# Patient Record
Sex: FEMALE | Race: BLACK OR AFRICAN AMERICAN | Employment: UNEMPLOYED | URBAN - METROPOLITAN AREA
[De-identification: names, ages, dates, MRNs, and addresses within clinical notes are randomized per-mention and may not be internally consistent; named-entity substitution may affect disease eponyms.]

---

## 2017-04-11 ENCOUNTER — HOSPITAL ENCOUNTER (OUTPATIENT)
Dept: GENERAL RADIOLOGY | Age: 57
Discharge: HOME | End: 2017-04-11
Payer: MEDICARE

## 2017-04-11 DIAGNOSIS — R76.11 PPD POSITIVE: ICD-10-CM

## 2017-04-11 PROCEDURE — 71020 XR CHEST PA LAT: CPT

## 2018-08-01 ENCOUNTER — HOSPITAL ENCOUNTER (EMERGENCY)
Age: 58
Discharge: HOME | End: 2018-08-01
Attending: EMERGENCY MEDICINE
Payer: MEDICARE

## 2018-08-01 VITALS
BODY MASS INDEX: 33.57 KG/M2 | DIASTOLIC BLOOD PRESSURE: 83 MMHG | TEMPERATURE: 98.6 F | OXYGEN SATURATION: 93 % | WEIGHT: 208.9 LBS | RESPIRATION RATE: 18 BRPM | HEART RATE: 102 BPM | HEIGHT: 66 IN | SYSTOLIC BLOOD PRESSURE: 150 MMHG

## 2018-08-01 DIAGNOSIS — M25.561 ARTHRALGIA OF RIGHT LOWER LEG: Primary | ICD-10-CM

## 2018-08-01 PROCEDURE — 99282 EMERGENCY DEPT VISIT SF MDM: CPT

## 2018-08-01 PROCEDURE — 93971 EXTREMITY STUDY: CPT

## 2018-08-01 RX ORDER — METHADONE HYDROCHLORIDE 10 MG/ML
5 CONCENTRATE ORAL
COMMUNITY

## 2018-08-01 RX ORDER — NAPROXEN 500 MG/1
500 TABLET ORAL 2 TIMES DAILY WITH MEALS
Qty: 20 TAB | Refills: 0 | Status: SHIPPED | OUTPATIENT
Start: 2018-08-01 | End: 2018-09-30

## 2018-08-01 NOTE — ED NOTES
Pt presents to the ED with c/o right leg pain x3 days. Pt reports he pain is sharp and nothing relieves the pian. Pt reports her left leg was hurting and they did a blood clot study and did not find anything for that they found that her left hip was broken. Pt reports she was recently bit by a mosquito on her right ankle but does not think tat is related. Pt denies taking any medications for pain. Pt is alert and oriented. Pt skin is warm and dry. No redness or hot area noted to patients right leg. Pt has palpable pulses in affected extremity. Pt is ambulatory independently. Emergency Department Nursing Plan of Care The Nursing Plan of Care is developed from the Nursing assessment and Emergency Department Attending provider initial evaluation. The plan of care may be reviewed in the ED Provider note. The Plan of Care was developed with the following considerations:  
Patient / Family readiness to learn indicated by:verbalized understanding Persons(s) to be included in education: patient Barriers to Learning/Limitations:No 
 
Signed Gearlean Slipper 8/1/2018   8:50 AM

## 2018-08-01 NOTE — PROCEDURES
[facility]  *** FINAL REPORT ***    Name: Sonu Longoria  MRN: [id number]  : 1960  HIS Order #: [id number]  [id number] [facility] Visit #: [id number]  Date: 01 Aug 2018    TYPE OF TEST: Peripheral Venous Testing    REASON FOR TEST  Pain in limb, Limb swelling    Right Leg:-  Deep venous thrombosis:           No  Superficial venous thrombosis:    No  Deep venous insufficiency:        Not examined  Superficial venous insufficiency: Not examined      INTERPRETATION/FINDINGS  Right leg :  1. Deep vein(s) visualized include the external iliac, common femoral,   proximal femoral, mid femoral, popliteal(fossa), posterior tibial and   peroneal veins. 2. No evidence of deep venous thrombosis detected in the veins  visualized. 3. No evidence of deep vein thrombosis in the contralateral common  femoral vein. 4. No evidence of superficial thrombosis detected. ADDITIONAL COMMENTS    I have personally reviewed the data relevant to the interpretation of  this  study. TECHNOLOGIST: Mickey Nath RVT  Signed: 2018 02:46 PM    PHYSICIAN: Valentine George.  Marcia Deng MD  Signed: 2018 01:38 PM

## 2018-08-01 NOTE — ED PROVIDER NOTES
EMERGENCY DEPARTMENT HISTORY AND PHYSICAL EXAM 
 
 
Date: 8/1/2018 Patient Name: You Weber History of Presenting Illness Chief Complaint Patient presents with  Leg Pain  
  pt reported rt leg pain x 3 days without known injury/fall History Provided By: Patient HPI: You Weber, 60 y.o. female with PMHx significant for asthma, htn, COPD, heart murmur, depression, sleep apnea presents ambulatory to the ED with cc of right leg pain x 3 days. Reports pain as sharp and radiating down leg. Denies low back pain. Denies trauma/injury. Denies numbness/tingling, swelling, erythema, warmth, drainage. Rates pain 4/10. Has not taken anything for sx. Pain worse with movement. Denies hx blood clot, estrogen use, recent chemotherapy or recent surgery. There are no other complaints, changes, or physical findings at this time. PCP: Nunu Trujillo NP Current Outpatient Prescriptions Medication Sig Dispense Refill  methadone (DOLOPHINE) 10 mg/mL solution Take 10 mg by mouth.  naproxen (NAPROSYN) 500 mg tablet Take 1 Tab by mouth two (2) times daily (with meals). 20 Tab 0  
 losartan-hydrochlorothiazide (HYZAAR) 100-25 mg per tablet Take 1 Tab by mouth daily.  PROAIR HFA 90 mcg/actuation inhaler   11  
 cloNIDine (CATAPRES) 0.1 mg tablet Take 0.3 mg by mouth daily.  ASPIRIN PO Take 81 ng by mouth daily.  albuterol (PROVENTIL VENTOLIN) 2.5 mg /3 mL (0.083 %) nebulizer solution 2.5 mg by Nebulization route three (3) times daily as needed for Wheezing. Past History Past Medical History: 
Past Medical History:  
Diagnosis Date  Arthritis  Asthma  Chronic obstructive pulmonary disease ([facility] [address]  Chronic pain   
 back - was seeing a pain specialist  
 Hypertension  Other ill-defined conditions(159.63)   
 heart murmur  Psychiatric disorder   
 on depression med after death of   Unspecified sleep apnea   
 uses CPAP/oxygen for night use on order but has not gotten machine Past Surgical History: 
Past Surgical History:  
Procedure Laterality Date  HX HYSTERECTOMY  HX ORTHOPAEDIC Bilateral   
 hip replacements  HX ORTHOPAEDIC Right   
 knee replacement  HX ORTHOPAEDIC Left   
 callus removed from toe  HX ORTHOPAEDIC Left   
 cyst removed from back of hand  NEUROLOGICAL PROCEDURE UNLISTED    
 4th and 5th discs in neck removed Family History: 
Family History Problem Relation Age of Onset  Diabetes Mother  Hypertension Mother  Hypertension Father  Diabetes Father  Liver Disease Father  Lung Disease Father   
  asbestos Social History: 
Social History Substance Use Topics  Smoking status: Current Every Day Smoker Packs/day: 0.50  Smokeless tobacco: Never Used Comment: cigarettes  Alcohol use No  
 
 
Allergies: Allergies Allergen Reactions  Robaxin [Methocarbamol] Rash, Itching and Nausea Only Review of Systems Review of Systems Constitutional: Negative for chills and fever. Respiratory: Negative for shortness of breath. Cardiovascular: Negative for chest pain. Gastrointestinal: Negative for abdominal pain, constipation, diarrhea, nausea and vomiting. Genitourinary: Negative for flank pain. Musculoskeletal: Negative for back pain and myalgias. Right leg pain Skin: Negative for color change, pallor, rash and wound. Neurological: Negative for dizziness, weakness and light-headedness. All other systems reviewed and are negative. Physical Exam  
Physical Exam  
Constitutional: She is oriented to person, place, and time. She appears well-developed and well-nourished. No distress. HENT:  
Head: Normocephalic and atraumatic. Eyes: Conjunctivae are normal.  
Cardiovascular: Normal rate, regular rhythm and normal heart sounds. Pulmonary/Chest: Effort normal and breath sounds normal. No respiratory distress. Abdominal: Soft. Bowel sounds are normal. She exhibits no distension. Musculoskeletal:  
Right leg pain: TTP calf. (-) Antoni Sign. No swelling, erythema, warmth. DP pulses strong and equal b/l Neurological: She is alert and oriented to person, place, and time. No cranial nerve deficit. Skin: Skin is warm. No rash noted. Psychiatric: She has a normal mood and affect. Her behavior is normal.  
Nursing note and vitals reviewed. Diagnostic Study Results Labs - No results found for this or any previous visit (from the past 12 hour(s)). Radiologic Studies -  
DUPLEX LOWER EXT VENOUS RIGHT    (Results Pending) CT Results  (Last 48 hours) None CXR Results  (Last 48 hours) None Medical Decision Making I am the first provider for this patient. I reviewed the vital signs, available nursing notes, past medical history, past surgical history, family history and social history. Vital Signs-Reviewed the patient's vital signs. Patient Vitals for the past 12 hrs: 
 Temp Pulse Resp BP SpO2  
08/01/18 1005 - (!) 102 18 150/83 -  
08/01/18 0848 98.6 °F (37 °C) (!) 102 20 (!) 129/98 93 % Records Reviewed: Nursing Notes, Old Medical Records, Previous Radiology Studies and Previous Laboratory Studies Provider Notes (Medical Decision Making): DDx: DVT, Leg pain, Muscle Spasm ED Course:  
Initial assessment performed. The patients presenting problems have been discussed, and they are in agreement with the care plan formulated and outlined with them. I have encouraged them to ask questions as they arise throughout their visit. Disposition: 
10:07 AM 
Discussed lab and imaging results with pt along with dx and treatment plan. Discussed importance of  PCP follow up. All questions answered. Pt voiced they understood. Return if sx worsen. PLAN: 
1. Current Discharge Medication List  
  
START taking these medications  Details  
naproxen (NAPROSYN) 500 mg tablet Take 1 Tab by mouth two (2) times daily (with meals). Qty: 20 Tab, Refills: 0  
  
  
 
2. Follow-up Information Follow up With Details Comments Contact Info Julio Kirk NP Schedule an appointment as soon as possible for a visit in 1 day for PCP f/u Return to ED if worse Diagnosis Clinical Impression: 1. Arthralgia of right lower leg

## 2018-08-01 NOTE — DISCHARGE INSTRUCTIONS
Musculoskeletal Pain: Care Instructions  Your Care Instructions    Different problems with the bones, muscles, nerves, ligaments, and tendons in the body can cause pain. One or more areas of your body may ache or burn. Or they may feel tired, stiff, or sore. The medical term for this type of pain is musculoskeletal pain. It can have many different causes. Sometimes the pain is caused by an injury such as a strain or sprain. Or you might have pain from using one part of your body in the same way over and over again. This is called overuse. In some cases, the cause of the pain is another health problem such as arthritis or fibromyalgia. The doctor will examine you and ask you questions about your health to help find the cause of your pain. Blood tests or imaging tests like an X-ray may also be helpful. But sometimes doctors can't find a cause of the pain. Treatment depends on your symptoms and the cause of the pain, if known. The doctor has checked you carefully, but problems can develop later. If you notice any problems or new symptoms, get medical treatment right away. Follow-up care is a key part of your treatment and safety. Be sure to make and go to all appointments, and call your doctor if you are having problems. It's also a good idea to know your test results and keep a list of the medicines you take. How can you care for yourself at home? · Rest until you feel better. · Do not do anything that makes the pain worse. Return to exercise gradually if you feel better and your doctor says it's okay. · Be safe with medicines. Read and follow all instructions on the label. ¨ If the doctor gave you a prescription medicine for pain, take it as prescribed. ¨ If you are not taking a prescription pain medicine, ask your doctor if you can take an over-the-counter medicine. · Put ice or a cold pack on the area for 10 to 20 minutes at a time to ease pain.  Put a thin cloth between the ice and your skin.  When should you call for help? Call your doctor now or seek immediate medical care if:    · You have new pain, or your pain gets worse.     · You have new symptoms such as a fever, a rash, or chills.    Watch closely for changes in your health, and be sure to contact your doctor if:    · You do not get better as expected. Where can you learn more? Go to http://dionte-carolina.info/. Enter W418 in the search box to learn more about \"Musculoskeletal Pain: Care Instructions. \"  Current as of: October 9, 2017  Content Version: 11.7  © 9404-[address]7 [address] [employer]. Care instructions adapted under license by [employer] (which disclaims liability or warranty for this information). If you have questions about a medical condition or this instruction, always ask your healthcare professional. [address] any warranty or liability for your use of this information.

## 2018-08-01 NOTE — ED NOTES
Pt reports she took an albuterol nebulizer before coming in and has not taken her blood pressure medications this morning.

## 2018-09-30 ENCOUNTER — HOSPITAL ENCOUNTER (EMERGENCY)
Age: 58
Discharge: HOME | End: 2018-09-30
Attending: EMERGENCY MEDICINE
Payer: MEDICARE

## 2018-09-30 ENCOUNTER — APPOINTMENT (OUTPATIENT)
Dept: CT IMAGING | Age: 58
End: 2018-09-30
Attending: EMERGENCY MEDICINE
Payer: MEDICARE

## 2018-09-30 VITALS
HEIGHT: 66 IN | RESPIRATION RATE: 15 BRPM | BODY MASS INDEX: 31.66 KG/M2 | OXYGEN SATURATION: 95 % | SYSTOLIC BLOOD PRESSURE: 152 MMHG | DIASTOLIC BLOOD PRESSURE: 85 MMHG | HEART RATE: 80 BPM | TEMPERATURE: 98.8 F | WEIGHT: 197 LBS

## 2018-09-30 DIAGNOSIS — H66.90 ACUTE OTITIS MEDIA, UNSPECIFIED OTITIS MEDIA TYPE: Primary | ICD-10-CM

## 2018-09-30 LAB
ALBUMIN SERPL-MCNC: 3.9 G/DL (ref 3.5–5)
ALBUMIN/GLOB SERPL: 0.8 {RATIO} (ref 1.1–2.2)
ALP SERPL-CCNC: 127 U/L (ref 45–117)
ALT SERPL-CCNC: 28 U/L (ref 12–78)
ANION GAP SERPL CALC-SCNC: 5 MMOL/L (ref 5–15)
AST SERPL-CCNC: 25 U/L (ref 15–37)
BASOPHILS # BLD: 0 K/UL (ref 0–0.1)
BASOPHILS NFR BLD: 0 % (ref 0–1)
BILIRUB SERPL-MCNC: 0.4 MG/DL (ref 0.2–1)
BUN SERPL-MCNC: 14 MG/DL (ref 6–20)
BUN/CREAT SERPL: 12 (ref 12–20)
CALCIUM SERPL-MCNC: 10 MG/DL (ref 8.5–10.1)
CHLORIDE SERPL-SCNC: 102 MMOL/L (ref 97–108)
CO2 SERPL-SCNC: 36 MMOL/L (ref 21–32)
CREAT SERPL-MCNC: 1.14 MG/DL (ref 0.55–1.02)
DIFFERENTIAL METHOD BLD: ABNORMAL
EOSINOPHIL # BLD: 0 K/UL (ref 0–0.4)
EOSINOPHIL NFR BLD: 0 % (ref 0–7)
ERYTHROCYTE [DISTWIDTH] IN BLOOD BY AUTOMATED COUNT: 11.9 % (ref 11.5–14.5)
GLOBULIN SER CALC-MCNC: 4.8 G/DL (ref 2–4)
GLUCOSE SERPL-MCNC: 88 MG/DL (ref 65–100)
HCT VFR BLD AUTO: 52.3 % (ref 35–47)
HGB BLD-MCNC: 16.9 G/DL (ref 11.5–16)
IMM GRANULOCYTES # BLD: 0 K/UL (ref 0–0.04)
IMM GRANULOCYTES NFR BLD AUTO: 0 % (ref 0–0.5)
LYMPHOCYTES # BLD: 2.6 K/UL (ref 0.8–3.5)
LYMPHOCYTES NFR BLD: 43 % (ref 12–49)
MCH RBC QN AUTO: 29.8 PG (ref 26–34)
MCHC RBC AUTO-ENTMCNC: 32.3 G/DL (ref 30–36.5)
MCV RBC AUTO: 92.1 FL (ref 80–99)
MONOCYTES # BLD: 0.3 K/UL (ref 0–1)
MONOCYTES NFR BLD: 5 % (ref 5–13)
NEUTS SEG # BLD: 3 K/UL (ref 1.8–8)
NEUTS SEG NFR BLD: 51 % (ref 32–75)
NRBC # BLD: 0 K/UL (ref 0–0.01)
NRBC BLD-RTO: 0 PER 100 WBC
PLATELET # BLD AUTO: 195 K/UL (ref 150–400)
PMV BLD AUTO: 11.3 FL (ref 8.9–12.9)
POTASSIUM SERPL-SCNC: 3.2 MMOL/L (ref 3.5–5.1)
PROT SERPL-MCNC: 8.7 G/DL (ref 6.4–8.2)
RBC # BLD AUTO: 5.68 M/UL (ref 3.8–5.2)
SODIUM SERPL-SCNC: 143 MMOL/L (ref 136–145)
WBC # BLD AUTO: 5.9 K/UL (ref 3.6–11)

## 2018-09-30 PROCEDURE — 80053 COMPREHEN METABOLIC PANEL: CPT | Performed by: EMERGENCY MEDICINE

## 2018-09-30 PROCEDURE — 93005 ELECTROCARDIOGRAM TRACING: CPT

## 2018-09-30 PROCEDURE — 36415 COLL VENOUS BLD VENIPUNCTURE: CPT | Performed by: EMERGENCY MEDICINE

## 2018-09-30 PROCEDURE — 74011250637 HC RX REV CODE- 250/637: Performed by: EMERGENCY MEDICINE

## 2018-09-30 PROCEDURE — 99284 EMERGENCY DEPT VISIT MOD MDM: CPT

## 2018-09-30 PROCEDURE — 70481 CT ORBIT/EAR/FOSSA W/DYE: CPT

## 2018-09-30 PROCEDURE — 74011636320 HC RX REV CODE- 636/320: Performed by: EMERGENCY MEDICINE

## 2018-09-30 PROCEDURE — 85025 COMPLETE CBC W/AUTO DIFF WBC: CPT | Performed by: EMERGENCY MEDICINE

## 2018-09-30 PROCEDURE — 70450 CT HEAD/BRAIN W/O DYE: CPT

## 2018-09-30 RX ORDER — AMOXICILLIN 500 MG/1
500 TABLET, FILM COATED ORAL 3 TIMES DAILY
Qty: 30 TAB | Refills: 0 | Status: SHIPPED | OUTPATIENT
Start: 2018-09-30 | End: 2019-12-05 | Stop reason: ALTCHOICE

## 2018-09-30 RX ORDER — DEXTROMETHORPHAN HYDROBROMIDE, GUAIFENESIN 5; 100 MG/5ML; MG/5ML
650 LIQUID ORAL EVERY 8 HOURS
COMMUNITY

## 2018-09-30 RX ORDER — ONDANSETRON 4 MG/1
4 TABLET, ORALLY DISINTEGRATING ORAL
Qty: 10 TAB | Refills: 0 | Status: SHIPPED | OUTPATIENT
Start: 2018-09-30

## 2018-09-30 RX ORDER — TRAMADOL HYDROCHLORIDE 50 MG/1
50 TABLET ORAL
Qty: 10 TAB | Refills: 0 | Status: SHIPPED | OUTPATIENT
Start: 2018-09-30

## 2018-09-30 RX ORDER — ACETAMINOPHEN 500 MG
1000 TABLET ORAL ONCE
Status: COMPLETED | OUTPATIENT
Start: 2018-09-30 | End: 2018-09-30

## 2018-09-30 RX ADMIN — IOPAMIDOL 100 ML: 612 INJECTION, SOLUTION INTRAVENOUS at 18:54

## 2018-09-30 RX ADMIN — ACETAMINOPHEN 1000 MG: 500 TABLET, FILM COATED ORAL at 18:38

## 2018-09-30 NOTE — DISCHARGE INSTRUCTIONS
Ear Infection (Otitis Media): Care Instructions  Your Care Instructions    An ear infection may start with a cold and affect the middle ear (otitis media). It can hurt a lot. Most ear infections clear up on their own in a couple of days. Most often you will not need antibiotics. This is because many ear infections are caused by a virus. Antibiotics don't work against a virus. Regular doses of pain medicines are the best way to reduce your fever and help you feel better. Follow-up care is a key part of your treatment and safety. Be sure to make and go to all appointments, and call your doctor if you are having problems. It's also a good idea to know your test results and keep a list of the medicines you take. How can you care for yourself at home? · Take pain medicines exactly as directed. ¨ If the doctor gave you a prescription medicine for pain, take it as prescribed. ¨ If you are not taking a prescription pain medicine, take an over-the-counter medicine, such as acetaminophen (Tylenol), ibuprofen (Advil, Motrin), or naproxen (Aleve). Read and follow all instructions on the label. ¨ Do not take two or more pain medicines at the same time unless the doctor told you to. Many pain medicines have acetaminophen, which is Tylenol. Too much acetaminophen (Tylenol) can be harmful. · Plan to take a full dose of pain reliever before bedtime. Getting enough sleep will help you get better. · Try a warm, moist washcloth on the ear. It may help relieve pain. · If your doctor prescribed antibiotics, take them as directed. Do not stop taking them just because you feel better. You need to take the full course of antibiotics. When should you call for help?   Call your doctor now or seek immediate medical care if:    · You have new or increasing ear pain.     · You have new or increasing pus or blood draining from your ear.     · You have a fever with a stiff neck or a severe headache.    Watch closely for changes in your health, and be sure to contact your doctor if:    · You have new or worse symptoms.     · You are not getting better after taking an antibiotic for 2 days. Where can you learn more? Go to http://dionte-carolina.info/. Enter N137 in the search box to learn more about \"Ear Infection (Otitis Media): Care Instructions. \"  Current as of: May 12, 2017  Content Version: 11.7  © 2301-7620 [employer]. Care instructions adapted under license by [employer] (which disclaims liability or warranty for this information). If you have questions about a medical condition or this instruction, always ask your healthcare professional. [address] any warranty or liability for your use of this information.

## 2018-09-30 NOTE — ED NOTES
Emergency Department Nursing Plan of Care The Nursing Plan of Care is developed from the Nursing assessment and Emergency Department Attending provider initial evaluation. The plan of care may be reviewed in the ED Provider note. The Plan of Care was developed with the following considerations:  
Patient / Family readiness to learn indicated by:verbalized understanding Persons(s) to be included in education: patient Barriers to Learning/Limitations:No 
 
Signed Corona Espinal 9/30/2018   5:10 PM 
 
See nursing assessment Patient is alert and oriented x 4 and in no acute distress at this time. Respirations are at a regular rate, depth, and pattern. Patient updated on plan of care and has no questions or concerns at this time.

## 2018-09-30 NOTE — ED PROVIDER NOTES
EMERGENCY DEPARTMENT HISTORY AND PHYSICAL EXAM 
 
 
Date: 9/30/2018 Patient Name: Ritu Carr History of Presenting Illness Chief Complaint Patient presents with  
 Headache Pt c/o left side of head pain awith presure for 5 days. + blurred vision for 5 days History Provided By: Patient HPI: Ritu Carr, 60 y.o. female with PMHx significant for HTN, COPD, sleep apnea, arthritis, chronic pain, presents ambulatory to the ED with cc of \"pounding, pressure-like\" pain behind the left ear x 5 days as well as dizziness and lightheadedness x 1 day. She notes that she has also been experiencing intermittent blurry vision to the left eye x 5 days. The pt notes that she has taken Tylenol and Ibuprofen for her pain with no relief. She denies any recent falls, trauma, or hitting her head. The pt denies any ringing in the ears, hearing loss, or recent cold sx's. She denies any alleviating/exacerbating factors for her sx's. The pt specifically denies any fever, chills, sore throat, congestion, sinus pain, cough, N/V/D, SOB, CP, HA, abdominal pain, dysuria or hematuria. There are no other complaints, changes, or physical findings at this time. Social Hx: - EtOH; + Smoker; - Illicit Drugs Family Hx: HTN, DM, liver dz, lung dz Surgical Hx: Significant hysterectomy, R TKA, BL hip replacements PCP: Jackeline Brandt NP Current Outpatient Prescriptions Medication Sig Dispense Refill  acetaminophen (TYLENOL ARTHRITIS PAIN) 650 mg TbER Take 650 mg by mouth every eight (8) hours.  amoxicillin 500 mg tab Take 500 mg by mouth three (3) times daily. 30 Tab 0  
 traMADol (ULTRAM) 50 mg tablet Take 1 Tab by mouth every six (6) hours as needed for Pain. Max Daily Amount: 200 mg. Indications: Pain 10 Tab 0  
 ondansetron (ZOFRAN ODT) 4 mg disintegrating tablet Take 1 Tab by mouth every eight (8) hours as needed for Nausea.  10 Tab 0  
  methadone (DOLOPHINE) 10 mg/mL solution Take 5 mg by mouth.  losartan-hydrochlorothiazide (HYZAAR) 100-25 mg per tablet Take 1 Tab by mouth daily.  PROAIR HFA 90 mcg/actuation inhaler   11  
 cloNIDine (CATAPRES) 0.1 mg tablet Take 0.3 mg by mouth daily.  ASPIRIN PO Take 81 ng by mouth daily.  albuterol (PROVENTIL VENTOLIN) 2.5 mg /3 mL (0.083 %) nebulizer solution 2.5 mg by Nebulization route three (3) times daily as needed for Wheezing. Past History Past Medical History: 
Past Medical History:  
Diagnosis Date  Arthritis  Asthma  Chronic obstructive pulmonary disease ([facility] [address]  Chronic pain   
 back - was seeing a pain specialist  
 Hypertension  Other ill-defined conditions(799.89)   
 heart murmur  Psychiatric disorder   
 on depression med after death of   Unspecified sleep apnea   
 uses CPAP/oxygen for night use on order but has not gotten machine Past Surgical History: 
Past Surgical History:  
Procedure Laterality Date  HX HYSTERECTOMY  HX ORTHOPAEDIC Bilateral   
 hip replacements  HX ORTHOPAEDIC Right   
 knee replacement  HX ORTHOPAEDIC Left   
 callus removed from toe  HX ORTHOPAEDIC Left   
 cyst removed from back of hand  NEUROLOGICAL PROCEDURE UNLISTED    
 4th and 5th discs in neck removed Family History: 
Family History Problem Relation Age of Onset  Diabetes Mother  Hypertension Mother  Hypertension Father  Diabetes Father  Liver Disease Father  Lung Disease Father   
  asbestos Social History: 
Social History Substance Use Topics  Smoking status: Current Every Day Smoker Packs/day: 0.50  Smokeless tobacco: Never Used Comment: cigarettes  Alcohol use No  
 
 
Allergies: Allergies Allergen Reactions  Robaxin [Methocarbamol] Rash, Itching and Nausea Only Review of Systems Review of Systems Constitutional: Negative for chills and fever. HENT: Negative for congestion, ear pain, rhinorrhea, sinus pain and sore throat. (+) pain behind left ear but denies ear pain Respiratory: Negative for cough and shortness of breath. Cardiovascular: Negative for chest pain. Gastrointestinal: Negative for abdominal pain, nausea and vomiting. Genitourinary: Negative for dysuria, hematuria and urgency. Skin: Negative for rash. Neurological: Positive for dizziness and light-headedness. Negative for headaches. All other systems reviewed and are negative. Physical Exam  
Physical Exam  
Constitutional: She is oriented to person, place, and time. She appears well-developed and well-nourished. No distress. HENT:  
Head: Normocephalic and atraumatic. Tender over L mastoid Purulence behind L TM Eyes: Conjunctivae and EOM are normal. Pupils are equal, round, and reactive to light. Neck: Normal range of motion. Cardiovascular: Normal rate, regular rhythm and intact distal pulses. Pulmonary/Chest: Effort normal and breath sounds normal. No stridor. No respiratory distress. Abdominal: Soft. She exhibits no distension. There is no tenderness. Musculoskeletal: Normal range of motion. Neurological: She is alert and oriented to person, place, and time. Skin: Skin is warm and dry. Psychiatric: She has a normal mood and affect. Nursing note and vitals reviewed. Diagnostic Study Results Labs - Recent Results (from the past 12 hour(s)) EKG, 12 LEAD, INITIAL Collection Time: 09/30/18  5:23 PM  
Result Value Ref Range Ventricular Rate 87 BPM  
 Atrial Rate 87 BPM  
 P-R Interval 178 ms QRS Duration 76 ms  
 Q-T Interval 376 ms QTC Calculation (Bezet) 452 ms Calculated P Axis 80 degrees Calculated R Axis 79 degrees Calculated T Axis 71 degrees Diagnosis Normal sinus rhythm Normal ECG No previous ECGs available CBC WITH AUTOMATED DIFF  Collection Time: 09/30/18  6:22 PM  
 Result Value Ref Range WBC 5.9 3.6 - 11.0 K/uL  
 RBC 5.68 (H) 3.80 - 5.20 M/uL  
 HGB 16.9 (H) 11.5 - 16.0 g/dL HCT 52.3 (H) 35.0 - 47.0 % MCV 92.1 80.0 - 99.0 FL  
 MCH 29.8 26.0 - 34.0 PG  
 MCHC 32.3 30.0 - 36.5 g/dL  
 RDW 11.9 11.5 - 14.5 % PLATELET 128 873 - 789 K/uL MPV 11.3 8.9 - 12.9 FL  
 NRBC 0.0 0  WBC ABSOLUTE NRBC 0.00 0.00 - 0.01 K/uL NEUTROPHILS 51 32 - 75 % LYMPHOCYTES 43 12 - 49 % MONOCYTES 5 5 - 13 % EOSINOPHILS 0 0 - 7 % BASOPHILS 0 0 - 1 % IMMATURE GRANULOCYTES 0 0.0 - 0.5 % ABS. NEUTROPHILS 3.0 1.8 - 8.0 K/UL  
 ABS. LYMPHOCYTES 2.6 0.8 - 3.5 K/UL  
 ABS. MONOCYTES 0.3 0.0 - 1.0 K/UL  
 ABS. EOSINOPHILS 0.0 0.0 - 0.4 K/UL  
 ABS. BASOPHILS 0.0 0.0 - 0.1 K/UL  
 ABS. IMM. GRANS. 0.0 0.00 - 0.04 K/UL  
 DF AUTOMATED METABOLIC PANEL, COMPREHENSIVE Collection Time: 09/30/18  6:22 PM  
Result Value Ref Range Sodium 143 136 - 145 mmol/L Potassium 3.2 (L) 3.5 - 5.1 mmol/L Chloride 102 97 - 108 mmol/L  
 CO2 36 (H) 21 - 32 mmol/L Anion gap 5 5 - 15 mmol/L Glucose 88 65 - 100 mg/dL BUN 14 6 - 20 MG/DL Creatinine 1.14 (H) 0.55 - 1.02 MG/DL  
 BUN/Creatinine ratio 12 12 - 20 GFR est AA 59 (L) >60 ml/min/1.73m2 GFR est non-AA 49 (L) >60 ml/min/1.73m2 Calcium 10.0 8.5 - 10.1 MG/DL Bilirubin, total 0.4 0.2 - 1.0 MG/DL  
 ALT (SGPT) 28 12 - 78 U/L  
 AST (SGOT) 25 15 - 37 U/L Alk. phosphatase 127 (H) 45 - 117 U/L Protein, total 8.7 (H) 6.4 - 8.2 g/dL Albumin 3.9 3.5 - 5.0 g/dL Globulin 4.8 (H) 2.0 - 4.0 g/dL A-G Ratio 0.8 (L) 1.1 - 2.2 Radiologic Studies -  
CT HEAD WO CONT Final Result CT TEMP BONES W CONT  
  
  
 
CT Results  (Last 48 hours) 09/30/18 1927  CT TEMP BONES W CONT Preliminary result Narrative:  *PRELIMINARY REPORT Minimal effusion in the inferior left mastoid air cells. No fluid in the middle  
ears. Otherwise normal temporal bones. Preliminary report was provided by Dr. Kole Chou, the on-call  
radiologist, at 1933 hours on 9/30/2018. Final report to follow. *END PRELIMINARY REPORT*  
   
   
   
   
   
   
  
 09/30/18 1920  CT HEAD WO CONT Final result Impression:  IMPRESSION: No acute intracranial abnormality. Narrative:  HEAD CT WITHOUT CONTRAST: 9/30/2018 7:20 PM  
   
INDICATION: Headache. COMPARISON: None. PROCEDURE: Axial images of the head were obtained without contrast. Coronal and  
sagittal reformats were performed. CT dose reduction was achieved through use of  
a standardized protocol tailored for this examination and automatic exposure  
control for dose modulation. FINDINGS: Minimal left inferior mastoid air cell effusions. The ventricles and  
sulci are appropriate in size and configuration for age. No loss of gray-white  
differentiation to suggest late acute or early subacute infarction. No mass  
effect or intracranial hemorrhage. CXR Results  (Last 48 hours) None Medical Decision Making I am the first provider for this patient. I reviewed the vital signs, available nursing notes, past medical history, past surgical history, family history and social history. Vital Signs-Reviewed the patient's vital signs. Patient Vitals for the past 12 hrs: 
 Temp Pulse Resp BP SpO2  
09/30/18 1840 - 80 15 152/85 95 % 09/30/18 1659 98.8 °F (37.1 °C) 89 16 149/81 92 % Pulse Oximetry Analysis - 92% on RA Cardiac Monitor:  
Rate: 89 bpm 
Rhythm: Normal Sinus Rhythm EKG interpretation: (Preliminary)1723 Rhythm: normal sinus rhythm; and regular . Rate (approx.): 87; Axis: normal; NJ interval: normal; QRS interval: normal ; ST/T wave: normal. 
Written by Perry . Jie Perez ED Scribe, as dictated by Nichole Rod. MD Sana 
 
Records Reviewed: Nursing Notes and Old Medical Records Provider Notes (Medical Decision Making):  
 DDx: otitis media, mastoiditis, tension HA, ICH, vertigo ED Course:  
Initial assessment performed. The patients presenting problems have been discussed, and they are in agreement with the care plan formulated and outlined with them. I have encouraged them to ask questions as they arise throughout their visit. Progress Notes: 
Procedure Note- Peripheral IV access with Ultrasound Guidance 6:24 PM 
Performed by: Hardik Ford. MD Sana 
Gained IV access using  20 gauge needle because the patient had no vascular access. After cleaning the site with alcohol prep, vein in left forearm was localized with ultrasound guidance in an anterior approach. Line confirmation was obtained by direct visualization and good blood return. No anaesthetic was used. The line was successfully flushed with normal saline and was secured with transparent tape. The patient tolerated the procedure without complication. SIGN OUT: 
7:00 PM 
Patient's presentation, labs/imaging and plan of care was reviewed with Mei Ochoa MD as part of sign out. They will review imaging and dispo as part of the plan discussed with the patient. Mei Ochoa MD's assistance in completion of this plan is greatly appreciated but it should be noted that I will be the provider of record for this patient. Hardik Ford. MD Sana 
 
MEDICATIONS GIVEN: 
Medications  
acetaminophen (TYLENOL) tablet 1,000 mg (1,000 mg Oral Given 9/30/18 1838) iopamidol (ISOVUE 300) 61 % contrast injection 100 mL (100 mL IntraVENous Given 9/30/18 1854) Critical Care Time:  
0 Disposition: 
Discharge Note: 
8:09 PM 
The patient has been re-evaluated and is ready for discharge. Reviewed available results with patient. Counseled patient/parent/guardian on diagnosis and care plan. Patient has expressed understanding, and all questions have been answered.  Patient agrees with plan and agrees to follow up as recommended, or return to the ED if their symptoms worsen. Discharge instructions have been provided and explained to the patient, along with reasons to return to the ED. PLAN: 
1. Current Discharge Medication List  
  
START taking these medications Details  
amoxicillin 500 mg tab Take 500 mg by mouth three (3) times daily. Qty: 30 Tab, Refills: 0 Associated Diagnoses: Acute otitis media, unspecified otitis media type  
  
traMADol (ULTRAM) 50 mg tablet Take 1 Tab by mouth every six (6) hours as needed for Pain. Max Daily Amount: 200 mg. Indications: Pain 
Qty: 10 Tab, Refills: 0 Associated Diagnoses: Acute otitis media, unspecified otitis media type  
  
ondansetron (ZOFRAN ODT) 4 mg disintegrating tablet Take 1 Tab by mouth every eight (8) hours as needed for Nausea. Qty: 10 Tab, Refills: 0 Associated Diagnoses: Acute otitis media, unspecified otitis media type 2. Follow-up Information Follow up With Details Comments Contact Info Amos Del Rio NP Schedule an appointment as soon as possible for a visit in 2 days  [address] [facility] [address] 
[phone] [facility] EMERGENCY DEPT  As needed, If symptoms worsen [address] [facility],[address] [facility] Return to ED if worse Diagnosis Clinical Impression: 1. Acute otitis media, unspecified otitis media type Attestations: This note is prepared by Renetta Toribio, acting as Scribe for MD SANDY Lopez MD: The scribe's documentation has been prepared under my direction and personally reviewed by me in its entirety. I confirm that the note above accurately reflects all work, treatment, procedures, and medical decision making performed by me. This note will not be viewable in [address]

## 2018-09-30 NOTE — ED NOTES
Verbal shift change report given to LAURIE Moraes (oncoming nurse) by Tabitha Fields (offgoing nurse). Report included the following information SBAR, ED Summary, Procedure Summary, MAR and Recent Results.

## 2018-10-01 LAB
ATRIAL RATE: 87 BPM
CALCULATED P AXIS, ECG09: 80 DEGREES
CALCULATED R AXIS, ECG10: 79 DEGREES
CALCULATED T AXIS, ECG11: 71 DEGREES
DIAGNOSIS, 93000: NORMAL
P-R INTERVAL, ECG05: 178 MS
Q-T INTERVAL, ECG07: 376 MS
QRS DURATION, ECG06: 76 MS
QTC CALCULATION (BEZET), ECG08: 452 MS
VENTRICULAR RATE, ECG03: 87 BPM

## 2018-10-01 NOTE — ED NOTES
Discharge instructions were given to the patient by Mark Camacho RN. The patient left the Emergency Department ambulatory, alert and oriented and in no acute distress with 3 prescriptions. The patient was encouraged to call or return to the ED for worsening issues or problems and was encouraged to schedule a follow up appointment for continuing care. The patient verbalized understanding of discharge instructions and prescriptions, all questions were answered. The patient has no further concerns at this time.

## 2019-12-05 ENCOUNTER — HOSPITAL ENCOUNTER (EMERGENCY)
Age: 59
Discharge: HOME | End: 2019-12-05
Attending: EMERGENCY MEDICINE
Payer: MEDICARE

## 2019-12-05 ENCOUNTER — APPOINTMENT (OUTPATIENT)
Dept: GENERAL RADIOLOGY | Age: 59
End: 2019-12-05
Attending: EMERGENCY MEDICINE
Payer: MEDICARE

## 2019-12-05 VITALS
WEIGHT: 165 LBS | DIASTOLIC BLOOD PRESSURE: 82 MMHG | HEIGHT: 65 IN | BODY MASS INDEX: 27.49 KG/M2 | RESPIRATION RATE: 20 BRPM | HEART RATE: 86 BPM | SYSTOLIC BLOOD PRESSURE: 136 MMHG | TEMPERATURE: 98.2 F | OXYGEN SATURATION: 89 %

## 2019-12-05 DIAGNOSIS — J44.1 COPD EXACERBATION (HCC): Primary | ICD-10-CM

## 2019-12-05 LAB
ALBUMIN SERPL-MCNC: 2.8 G/DL (ref 3.5–5)
ALBUMIN/GLOB SERPL: 0.6 {RATIO} (ref 1.1–2.2)
ALP SERPL-CCNC: 111 U/L (ref 45–117)
ALT SERPL-CCNC: 20 U/L (ref 12–78)
ANION GAP SERPL CALC-SCNC: 4 MMOL/L (ref 5–15)
AST SERPL-CCNC: 22 U/L (ref 15–37)
BASOPHILS # BLD: 0 K/UL (ref 0–0.1)
BASOPHILS NFR BLD: 0 % (ref 0–1)
BILIRUB SERPL-MCNC: 0.8 MG/DL (ref 0.2–1)
BUN SERPL-MCNC: 12 MG/DL (ref 6–20)
BUN/CREAT SERPL: 13 (ref 12–20)
CALCIUM SERPL-MCNC: 9.3 MG/DL (ref 8.5–10.1)
CHLORIDE SERPL-SCNC: 94 MMOL/L (ref 97–108)
CO2 SERPL-SCNC: 38 MMOL/L (ref 21–32)
CREAT SERPL-MCNC: 0.9 MG/DL (ref 0.55–1.02)
DIFFERENTIAL METHOD BLD: ABNORMAL
EOSINOPHIL # BLD: 0 K/UL (ref 0–0.4)
EOSINOPHIL NFR BLD: 0 % (ref 0–7)
ERYTHROCYTE [DISTWIDTH] IN BLOOD BY AUTOMATED COUNT: 12.7 % (ref 11.5–14.5)
GLOBULIN SER CALC-MCNC: 4.4 G/DL (ref 2–4)
GLUCOSE SERPL-MCNC: 132 MG/DL (ref 65–100)
HCT VFR BLD AUTO: 51.1 % (ref 35–47)
HGB BLD-MCNC: 16.1 G/DL (ref 11.5–16)
IMM GRANULOCYTES # BLD AUTO: 0 K/UL
IMM GRANULOCYTES NFR BLD AUTO: 0 %
LYMPHOCYTES # BLD: 0.8 K/UL (ref 0.8–3.5)
LYMPHOCYTES NFR BLD: 14 % (ref 12–49)
MCH RBC QN AUTO: 29.4 PG (ref 26–34)
MCHC RBC AUTO-ENTMCNC: 31.5 G/DL (ref 30–36.5)
MCV RBC AUTO: 93.2 FL (ref 80–99)
MONOCYTES # BLD: 0.2 K/UL (ref 0–1)
MONOCYTES NFR BLD: 3 % (ref 5–13)
NEUTS SEG # BLD: 4.7 K/UL (ref 1.8–8)
NEUTS SEG NFR BLD: 83 % (ref 32–75)
NRBC # BLD: 0 K/UL (ref 0–0.01)
NRBC BLD-RTO: 0 PER 100 WBC
PLATELET # BLD AUTO: 137 K/UL (ref 150–400)
PMV BLD AUTO: 12.2 FL (ref 8.9–12.9)
POTASSIUM SERPL-SCNC: 2.9 MMOL/L (ref 3.5–5.1)
PROT SERPL-MCNC: 7.2 G/DL (ref 6.4–8.2)
RBC # BLD AUTO: 5.48 M/UL (ref 3.8–5.2)
RBC MORPH BLD: ABNORMAL
SODIUM SERPL-SCNC: 136 MMOL/L (ref 136–145)
WBC # BLD AUTO: 5.7 K/UL (ref 3.6–11)
WBC MORPH BLD: ABNORMAL

## 2019-12-05 PROCEDURE — 77030029684 HC NEB SM VOL KT MONA -A

## 2019-12-05 PROCEDURE — 71045 X-RAY EXAM CHEST 1 VIEW: CPT

## 2019-12-05 PROCEDURE — 94640 AIRWAY INHALATION TREATMENT: CPT

## 2019-12-05 PROCEDURE — 85025 COMPLETE CBC W/AUTO DIFF WBC: CPT

## 2019-12-05 PROCEDURE — 74011250636 HC RX REV CODE- 250/636: Performed by: EMERGENCY MEDICINE

## 2019-12-05 PROCEDURE — 74011250637 HC RX REV CODE- 250/637: Performed by: EMERGENCY MEDICINE

## 2019-12-05 PROCEDURE — 77010033678 HC OXYGEN DAILY

## 2019-12-05 PROCEDURE — 74011000250 HC RX REV CODE- 250: Performed by: EMERGENCY MEDICINE

## 2019-12-05 PROCEDURE — 96372 THER/PROPH/DIAG INJ SC/IM: CPT

## 2019-12-05 PROCEDURE — 36415 COLL VENOUS BLD VENIPUNCTURE: CPT

## 2019-12-05 PROCEDURE — 80053 COMPREHEN METABOLIC PANEL: CPT

## 2019-12-05 PROCEDURE — 96365 THER/PROPH/DIAG IV INF INIT: CPT

## 2019-12-05 PROCEDURE — 99285 EMERGENCY DEPT VISIT HI MDM: CPT

## 2019-12-05 RX ORDER — POTASSIUM CHLORIDE 750 MG/1
40 TABLET, FILM COATED, EXTENDED RELEASE ORAL
Status: COMPLETED | OUTPATIENT
Start: 2019-12-05 | End: 2019-12-05

## 2019-12-05 RX ORDER — AZITHROMYCIN 250 MG/1
TABLET, FILM COATED ORAL
Qty: 6 TAB | Refills: 0 | Status: SHIPPED | OUTPATIENT
Start: 2019-12-05 | End: 2019-12-05

## 2019-12-05 RX ORDER — ALBUTEROL SULFATE 90 UG/1
2 AEROSOL, METERED RESPIRATORY (INHALATION)
Qty: 1 INHALER | Refills: 0 | Status: SHIPPED | OUTPATIENT
Start: 2019-12-05

## 2019-12-05 RX ORDER — AZITHROMYCIN 500 MG/1
500 TABLET, FILM COATED ORAL
Status: COMPLETED | OUTPATIENT
Start: 2019-12-05 | End: 2019-12-05

## 2019-12-05 RX ORDER — PREDNISONE 20 MG/1
60 TABLET ORAL DAILY
Qty: 15 TAB | Refills: 0 | Status: SHIPPED | OUTPATIENT
Start: 2019-12-05 | End: 2019-12-05

## 2019-12-05 RX ORDER — IPRATROPIUM BROMIDE AND ALBUTEROL SULFATE 2.5; .5 MG/3ML; MG/3ML
3 SOLUTION RESPIRATORY (INHALATION)
Qty: 30 NEBULE | Refills: 0 | Status: SHIPPED | OUTPATIENT
Start: 2019-12-05

## 2019-12-05 RX ORDER — ALBUTEROL SULFATE 0.83 MG/ML
2.5 SOLUTION RESPIRATORY (INHALATION)
Status: COMPLETED | OUTPATIENT
Start: 2019-12-05 | End: 2019-12-05

## 2019-12-05 RX ORDER — MAGNESIUM SULFATE 1 G/100ML
1 INJECTION INTRAVENOUS
Status: COMPLETED | OUTPATIENT
Start: 2019-12-05 | End: 2019-12-05

## 2019-12-05 RX ORDER — AZITHROMYCIN 250 MG/1
TABLET, FILM COATED ORAL
Qty: 6 TAB | Refills: 0 | Status: SHIPPED | OUTPATIENT
Start: 2019-12-05

## 2019-12-05 RX ORDER — LOSARTAN POTASSIUM AND HYDROCHLOROTHIAZIDE 25; 100 MG/1; MG/1
1 TABLET ORAL DAILY
Qty: 30 TAB | Refills: 0 | Status: SHIPPED | OUTPATIENT
Start: 2019-12-05 | End: 2019-12-05

## 2019-12-05 RX ORDER — IPRATROPIUM BROMIDE AND ALBUTEROL SULFATE 2.5; .5 MG/3ML; MG/3ML
3 SOLUTION RESPIRATORY (INHALATION)
Status: COMPLETED | OUTPATIENT
Start: 2019-12-05 | End: 2019-12-05

## 2019-12-05 RX ORDER — CLONIDINE HYDROCHLORIDE 0.1 MG/1
0.2 TABLET ORAL
Status: COMPLETED | OUTPATIENT
Start: 2019-12-05 | End: 2019-12-05

## 2019-12-05 RX ORDER — PREDNISONE 20 MG/1
60 TABLET ORAL DAILY
Qty: 15 TAB | Refills: 0 | Status: SHIPPED | OUTPATIENT
Start: 2019-12-05 | End: 2019-12-10

## 2019-12-05 RX ORDER — DEXAMETHASONE SODIUM PHOSPHATE 100 MG/10ML
10 INJECTION INTRAMUSCULAR; INTRAVENOUS
Status: COMPLETED | OUTPATIENT
Start: 2019-12-05 | End: 2019-12-05

## 2019-12-05 RX ORDER — LOSARTAN POTASSIUM AND HYDROCHLOROTHIAZIDE 25; 100 MG/1; MG/1
1 TABLET ORAL DAILY
Qty: 30 TAB | Refills: 0 | Status: SHIPPED | OUTPATIENT
Start: 2019-12-05

## 2019-12-05 RX ORDER — IPRATROPIUM BROMIDE AND ALBUTEROL SULFATE 2.5; .5 MG/3ML; MG/3ML
6 SOLUTION RESPIRATORY (INHALATION)
Status: COMPLETED | OUTPATIENT
Start: 2019-12-05 | End: 2019-12-05

## 2019-12-05 RX ADMIN — CLONIDINE HYDROCHLORIDE 0.2 MG: 0.1 TABLET ORAL at 17:44

## 2019-12-05 RX ADMIN — IPRATROPIUM BROMIDE AND ALBUTEROL SULFATE 6 ML: .5; 3 SOLUTION RESPIRATORY (INHALATION) at 19:42

## 2019-12-05 RX ADMIN — DEXAMETHASONE SODIUM PHOSPHATE 10 MG: 10 INJECTION INTRAMUSCULAR; INTRAVENOUS at 16:50

## 2019-12-05 RX ADMIN — AZITHROMYCIN MONOHYDRATE 500 MG: 500 TABLET ORAL at 18:25

## 2019-12-05 RX ADMIN — POTASSIUM CHLORIDE 40 MEQ: 750 TABLET, EXTENDED RELEASE ORAL at 19:35

## 2019-12-05 RX ADMIN — IPRATROPIUM BROMIDE AND ALBUTEROL SULFATE 3 ML: .5; 3 SOLUTION RESPIRATORY (INHALATION) at 16:40

## 2019-12-05 RX ADMIN — MAGNESIUM SULFATE HEPTAHYDRATE 1 G: 1 INJECTION, SOLUTION INTRAVENOUS at 19:49

## 2019-12-05 RX ADMIN — SODIUM CHLORIDE 1000 ML: 900 INJECTION, SOLUTION INTRAVENOUS at 19:49

## 2019-12-05 RX ADMIN — ALBUTEROL SULFATE 2.5 MG: 2.5 SOLUTION RESPIRATORY (INHALATION) at 18:15

## 2019-12-05 NOTE — ED NOTES
Pt presents to ED ambulatory complaining of shortness of breath for three days . Pt is alert and oriented x 4, RR even and unlabored, skin is warm and dry. Assessment completed and pt updated on plan of care. Emergency Department Nursing Plan of Care       The Nursing Plan of Care is developed from the Nursing assessment and Emergency Department Attending provider initial evaluation. The plan of care may be reviewed in the ED Provider note.     The Plan of Care was developed with the following considerations:   Patient / Family readiness to learn indicated by:verbalized understanding  Persons(s) to be included in education: patient  Barriers to Learning/Limitations:No    Signed     Herbert Vazquez RN    12/5/2019   4:41 PM

## 2019-12-05 NOTE — ED NOTES
Bedside and Verbal shift change report given to Matias Francisco (oncoming nurse) by Diamond Nevarez (offgoing nurse). Report included the following information SBAR, Kardex, ED Summary and MAR`.

## 2019-12-05 NOTE — DISCHARGE INSTRUCTIONS

## 2019-12-05 NOTE — ED NOTES
Pt brought from triage to room 2 and placed on continuous pulse ox. Continues with readings in the mid eighties. Respiratory called for breathing treatments. Pulse ox is moved to different extremity and different fingers with same results.  Patient then started on supplemental oxygen starting at 2L/min

## 2019-12-05 NOTE — ED TRIAGE NOTES
Pt c/o cough x 3-4 days , vomiting and dizziness. Pt has asthma. States she has been getting on neb machine 3-4x daily but it isn't helping much.

## 2019-12-06 NOTE — ED PROVIDER NOTES
Ms. Rennie Babinski is a 62-year-old female who has a 30year smoking history and pmh of asthma, copd, htn, sleep apnea on CPAP, arthritis, depression, chronic back pain presents with 3 days of dyspnea. She said that she started off with a \"bad cold. \".  States that she was having a cough productive of yellow to brown mucus and was occasionally having episodes of posttussive emesis. She reports associated sore throat, runny nose, sinus congestion. Denies headache, chest pain or leg swelling. She denies orthopnea. She is not on oxygen at home when she came into triage sats were in the 80s. I saw her after receiving signout from Dr. Riky Arriola after she'd been in the ER for nearly 3 hours, and she is reporting improved breathing after nebulizer treatments. Currently satting 95% on 2 L. Tells me that while she feels improved, she is not yet back to baseline. Cough   Associated symptoms include rhinorrhea, sore throat, shortness of breath and vomiting. Pertinent negatives include no chest pain, no chills, no headaches, no myalgias, no nausea and no confusion.         Past Medical History:   Diagnosis Date    Arthritis     Asthma     Chronic obstructive pulmonary disease (HCC)     Chronic pain     back - was seeing a pain specialist    Hypertension     Other ill-defined conditions(353.66)     heart murmur    Psychiatric disorder     on depression med after death of     Unspecified sleep apnea     uses CPAP/oxygen for night use on order but has not gotten machine       Past Surgical History:   Procedure Laterality Date    HX HYSTERECTOMY      HX ORTHOPAEDIC Bilateral     hip replacements    HX ORTHOPAEDIC Right     knee replacement    HX ORTHOPAEDIC Left     callus removed from toe    HX ORTHOPAEDIC Left     cyst removed from back of hand    NEUROLOGICAL PROCEDURE UNLISTED      4th and 5th discs in neck removed         Family History:   Problem Relation Age of Onset    Diabetes Mother     Hypertension Mother     Hypertension Father     Diabetes Father     Liver Disease Father     Lung Disease Father         asbestos       Social History     Socioeconomic History    Marital status:      Spouse name: Not on file    Number of children: Not on file    Years of education: Not on file    Highest education level: Not on file   Occupational History    Not on file   Social Needs    Financial resource strain: Not on file    Food insecurity:     Worry: Not on file     Inability: Not on file    Transportation needs:     Medical: Not on file     Non-medical: Not on file   Tobacco Use    Smoking status: Current Every Day Smoker     Packs/day: 0.50    Smokeless tobacco: Never Used    Tobacco comment: cigarettes   Substance and Sexual Activity    Alcohol use: No    Drug use: No    Sexual activity: Not on file   Lifestyle    Physical activity:     Days per week: Not on file     Minutes per session: Not on file    Stress: Not on file   Relationships    Social connections:     Talks on phone: Not on file     Gets together: Not on file     Attends Oriental orthodox service: Not on file     Active member of club or organization: Not on file     Attends meetings of clubs or organizations: Not on file     Relationship status: Not on file    Intimate partner violence:     Fear of current or ex partner: Not on file     Emotionally abused: Not on file     Physically abused: Not on file     Forced sexual activity: Not on file   Other Topics Concern    Not on file   Social History Narrative    Not on file         ALLERGIES: Robaxin [methocarbamol]    Review of Systems   Constitutional: Negative. Negative for chills, fever and unexpected weight change. HENT: Positive for congestion, rhinorrhea and sore throat. Negative for trouble swallowing. Eyes: Negative for discharge. Respiratory: Positive for cough and shortness of breath. Negative for chest tightness. Cardiovascular: Negative.   Negative for chest pain and leg swelling. Gastrointestinal: Positive for vomiting. Negative for abdominal distention, abdominal pain, constipation, diarrhea and nausea. Endocrine: Negative. Genitourinary: Negative. Negative for difficulty urinating, dysuria, frequency and urgency. Musculoskeletal: Negative. Negative for arthralgias and myalgias. Skin: Negative. Negative for color change. Allergic/Immunologic: Negative. Neurological: Negative. Negative for dizziness, speech difficulty and headaches. Hematological: Negative. Psychiatric/Behavioral: Negative. Negative for agitation and confusion. All other systems reviewed and are negative. Vitals:    12/05/19 1800 12/05/19 1803 12/05/19 1816 12/05/19 1900   BP: (!) 171/116   (!) 124/95   Pulse: 92      Resp:       Temp:       SpO2: 93% 93% 93% 93%   Weight:       Height:                Physical Exam  Vitals signs reviewed. Constitutional:       Appearance: She is well-developed. HENT:      Head: Normocephalic and atraumatic. Eyes:      Conjunctiva/sclera: Conjunctivae normal.   Neck:      Musculoskeletal: Neck supple. Cardiovascular:      Rate and Rhythm: Normal rate and regular rhythm. Pulmonary:      Effort: Pulmonary effort is normal. No respiratory distress. Comments: At the time of my exam at 715, patient had decreased air movement bilaterally with trace wheeze. No rales. No increased work of breathing. Speaking easily in full sentences. Abdominal:      Palpations: Abdomen is soft. Tenderness: There is no tenderness. Musculoskeletal: Normal range of motion. General: No deformity. Skin:     General: Skin is warm and dry. Neurological:      Mental Status: She is alert and oriented to person, place, and time. Psychiatric:         Behavior: Behavior normal.         Thought Content:  Thought content normal.          [facility]  ED Course as of Dec 06 0410   Thu Dec 05, 2019   1921 Has had 1 DuoNeb, 5 mg of albuterol, Decadron, azithromycin. She still has an oxygen requirement. Will give 2 additional duo nebs as well as magnesium and IV fluid. Will replete potassium. CBC pending    [SS]   2143 Patient has told me on 3 separate instances when I have gone in the room to reevaluate that she feels better wants to go home. Sats are borderline. We ambulated her around the ED and afterwards her sats, once we placed an appropriately sensitive pulse ox monitor, or 90%. The patient offered admission for further management of COPD exacerbation. She declined. Understands to return for any worsening dyspnea. [SS]      ED Course User Index  [SS] Christen Adler MD       Procedures      LABORATORY TESTS:  Recent Results (from the past 12 hour(s))   METABOLIC PANEL, COMPREHENSIVE    Collection Time: 12/05/19  5:29 PM   Result Value Ref Range    Sodium 136 136 - 145 mmol/L    Potassium 2.9 (L) 3.5 - 5.1 mmol/L    Chloride 94 (L) 97 - 108 mmol/L    CO2 38 (H) 21 - 32 mmol/L    Anion gap 4 (L) 5 - 15 mmol/L    Glucose 132 (H) 65 - 100 mg/dL    BUN 12 6 - 20 MG/DL    Creatinine 0.90 0.55 - 1.02 MG/DL    BUN/Creatinine ratio 13 12 - 20      GFR est AA >60 >60 ml/min/1.73m2    GFR est non-AA >60 >60 ml/min/1.73m2    Calcium 9.3 8.5 - 10.1 MG/DL    Bilirubin, total 0.8 0.2 - 1.0 MG/DL    ALT (SGPT) 20 12 - 78 U/L    AST (SGOT) 22 15 - 37 U/L    Alk.  phosphatase 111 45 - 117 U/L    Protein, total 7.2 6.4 - 8.2 g/dL    Albumin 2.8 (L) 3.5 - 5.0 g/dL    Globulin 4.4 (H) 2.0 - 4.0 g/dL    A-G Ratio 0.6 (L) 1.1 - 2.2     CBC WITH AUTOMATED DIFF    Collection Time: 12/05/19  8:27 PM   Result Value Ref Range    WBC 5.7 3.6 - 11.0 K/uL    RBC 5.48 (H) 3.80 - 5.20 M/uL    HGB 16.1 (H) 11.5 - 16.0 g/dL    HCT 51.1 (H) 35.0 - 47.0 %    MCV 93.2 80.0 - 99.0 FL    MCH 29.4 26.0 - 34.0 PG    MCHC 31.5 30.0 - 36.5 g/dL    RDW 12.7 11.5 - 14.5 %    PLATELET 015 (L) 817 - 400 K/uL    MPV 12.2 8.9 - 12.9 FL    NRBC 0.0 0  WBC    ABSOLUTE NRBC 0.00 0.00 - 0.01 K/uL    NEUTROPHILS 83 (H) 32 - 75 %    LYMPHOCYTES 14 12 - 49 %    MONOCYTES 3 (L) 5 - 13 %    EOSINOPHILS 0 0 - 7 %    BASOPHILS 0 0 - 1 %    IMMATURE GRANULOCYTES 0 %    ABS. NEUTROPHILS 4.7 1.8 - 8.0 K/UL    ABS. LYMPHOCYTES 0.8 0.8 - 3.5 K/UL    ABS. MONOCYTES 0.2 0.0 - 1.0 K/UL    ABS. EOSINOPHILS 0.0 0.0 - 0.4 K/UL    ABS. BASOPHILS 0.0 0.0 - 0.1 K/UL    ABS. IMM. GRANS. 0.0 K/UL    DF MANUAL      RBC COMMENTS NORMOCYTIC, NORMOCHROMIC      WBC COMMENTS REACTIVE LYMPHS         IMAGING RESULTS:  XR CHEST PORT   Final Result   IMPRESSION: No pneumonia. Questionable interstitial pulmonary edema. MEDICATIONS GIVEN:  Medications   albuterol-ipratropium (DUO-NEB) 2.5 MG-0.5 MG/3 ML (3 mL Nebulization Given 12/5/19 1640)   dexamethasone (DECADRON) injection 10 mg (10 mg IntraMUSCular Given 12/5/19 1650)   cloNIDine HCl (CATAPRES) tablet 0.2 mg (0.2 mg Oral Given 12/5/19 1744)   albuterol (PROVENTIL VENTOLIN) nebulizer solution 2.5 mg (2.5 mg Nebulization Given 12/5/19 1815)   azithromycin (ZITHROMAX) tablet 500 mg (500 mg Oral Given 12/5/19 1825)   albuterol-ipratropium (DUO-NEB) 2.5 MG-0.5 MG/3 ML (6 mL Nebulization Given 12/5/19 1942)   magnesium sulfate 1 g/100 ml IVPB (premix or compounded) (0 g IntraVENous IV Completed 12/5/19 2057)   sodium chloride 0.9 % bolus infusion 1,000 mL (0 mL IntraVENous IV Completed 12/5/19 2210)   potassium chloride SR (KLOR-CON 10) tablet 40 mEq (40 mEq Oral Given 12/5/19 1935)       IMPRESSION:  1. COPD exacerbation ([facility] [address]        PLAN:  1. Discharge Medication List as of 12/5/2019 10:03 PM      START taking these medications    Details   predniSONE (DELTASONE) 20 mg tablet Take 60 mg by mouth daily for 5 days. , Print, Disp-15 Tab, R-0      azithromycin (ZITHROMAX Z-IDANIA) 250 mg tablet Take two tablets today then one tablet daily, Print, Disp-6 Tab, R-0      !! losartan-hydroCHLOROthiazide (HYZAAR) 100-25 mg per tablet Take 1 Tab by mouth daily. , Print, Disp-30 Tab, R-0 albuterol-ipratropium (DUO-NEB) 2.5 mg-0.5 mg/3 ml nebu 3 mL by Nebulization route every four (4) hours as needed for Cough. , Print, Disp-30 Nebule, R-0      !! albuterol (PROVENTIL HFA, VENTOLIN HFA, PROAIR HFA) 90 mcg/actuation inhaler Take 2 Puffs by inhalation every four (4) hours as needed for Wheezing., Print, Disp-1 Inhaler, R-0       !! - Potential duplicate medications found. Please discuss with provider. CONTINUE these medications which have NOT CHANGED    Details   !! losartan-hydrochlorothiazide (HYZAAR) 100-25 mg per tablet Take 1 Tab by mouth daily. , Historical Med      cloNIDine (CATAPRES) 0.1 mg tablet Take 0.3 mg by mouth daily. , Historical Med      ASPIRIN PO Take 81 ng by mouth daily. , Historical Med      albuterol (PROVENTIL VENTOLIN) 2.5 mg /3 mL (0.083 %) nebulizer solution 2.5 mg by Nebulization route three (3) times daily as needed for Wheezing., Historical Med      acetaminophen (TYLENOL ARTHRITIS PAIN) 650 mg TbER Take 650 mg by mouth every eight (8) hours. , Historical Med      traMADol (ULTRAM) 50 mg tablet Take 1 Tab by mouth every six (6) hours as needed for Pain. Max Daily Amount: 200 mg. Indications: Pain, Print, Disp-10 Tab, R-0      !! ondansetron (ZOFRAN ODT) 4 mg disintegrating tablet Take 1 Tab by mouth every eight (8) hours as needed for Nausea., Normal, Disp-10 Tab, R-0      !! ondansetron (ZOFRAN ODT) 4 mg disintegrating tablet Take 1 Tab by mouth every eight (8) hours as needed for Nausea., Normal, Disp-10 Tab, R-0      methadone (DOLOPHINE) 10 mg/mL solution Take 5 mg by mouth., Historical Med      !! PROAIR HFA 90 mcg/actuation inhaler Historical Med, R-11       !! - Potential duplicate medications found. Please discuss with provider.       STOP taking these medications       amoxicillin 500 mg tab Comments:   Reason for Stopping:         amoxicillin 500 mg tab Comments:   Reason for Stoppin.   Follow-up Information     Follow up With Specialties Details Why Contact Bryan Salgado NP Nurse Practitioner Schedule an appointment as soon as possible for a visit  [phone] [facility] [phone]      [facility] EMERGENCY DEPT Emergency Medicine  As needed, If symptoms worsen Rolo Canales  [phone]        Return to ED if worse

## 2019-12-06 NOTE — ED NOTES
..Discharge summary and discharge medications reviewed with patient and appropriate educational materials and side effects teaching were provided. patient  Given 5 paper prescriptions and 0 electronic prescriptions sent to pt's listed pharmacy. Patient verbalized understanding of the importance of discussing medications with his or her physician or clinic they will be following up with. No si/s of acute distress prior to discharge. Patient offered wheelchair from treatment area to hospital entrance, patient declined wheelchair.

## 2020-01-09 ENCOUNTER — HOSPITAL ENCOUNTER (EMERGENCY)
Age: 60
Discharge: HOME | End: 2020-01-09
Attending: EMERGENCY MEDICINE
Payer: MEDICARE

## 2020-01-09 VITALS
WEIGHT: 181 LBS | BODY MASS INDEX: 30.9 KG/M2 | RESPIRATION RATE: 18 BRPM | TEMPERATURE: 99 F | SYSTOLIC BLOOD PRESSURE: 170 MMHG | HEIGHT: 64 IN | OXYGEN SATURATION: 91 % | DIASTOLIC BLOOD PRESSURE: 82 MMHG

## 2020-01-09 DIAGNOSIS — H57.89 REDNESS OF BOTH EYES: Primary | ICD-10-CM

## 2020-01-09 PROCEDURE — 99283 EMERGENCY DEPT VISIT LOW MDM: CPT

## 2020-01-09 NOTE — ED NOTES
Patient (s)  given copy of dc instructions and 0 script(s). Patient (s)  verbalized understanding of instructions). Patient given a current medication reconciliation form and verbalized understanding of their medications. Patient (s) verbalized understanding of the importance of discussing medications with  his or her physician or clinic they will be following up with. Patient alert and oriented and in no acute distress. Patient discharged home ambulatory with family.

## 2020-01-09 NOTE — DISCHARGE INSTRUCTIONS
Patient Education        Learning About Your Eyes  What do your eyes do? You see with your eyes. In a healthy eye, light goes through the pupil. Then your lens focuses the light on the retina at the back of the eye. The retina changes the light into electrical signals that go to the brain. The brain turns these into images. What problems can happen to your eyes? Problems with your eyes may include:  · Infections. These include pinkeye, a stye, or blepharitis. ? Pinkeye is redness and swelling of the lining of the eyelid and the surface of the eye. A gray or yellow fluid may ooze from the eye.  ? A stye is an infection in the tiny oil glands along the edge of the eyelid. Your eyelid may be swollen or tender. And you may have a tender lump on it. Fluid may drain from this lump. ? Blepharitis is swelling or infection of the eyelid. The edge of your eyelid may be itchy, red, and irritated. You may also have sores on your eyelid. Your eye may burn or itch. · Eye injuries, such as a chemical or object in your eye. Long-term eye problems may include:  · Cataracts. This is a painless, cloudy area in the lens of the eye. It blocks light from the retina and may cause vision problems. · Nearsightedness. This means it is hard to see things far from you. · Farsightedness. This means it is hard to see things close to you. · Macular degeneration. This is the loss of your central vision. · Glaucoma. This causes you to lose your vision, especially your side vision. It can lead to blindness. · Diabetic retinopathy. This can damage the retina and make your vision worse over time. How can you prevent eye problems? · Don't rub your eyes. Rubbing can irritate them. It can also move bacteria into your eyes and cause infections. If you need to touch your eyes, wash your hands first.  · Protect your eyes from dust and air pollution. For example, wear safety glasses when you rake or mow the lawn.   · If you have diabetes, keep your blood sugar under control. · Wear goggles or protective glasses when you work with power tools or chemicals or when you hammer nails. · Wear goggles or protective glasses when you play sports that can be dangerous for your eyes. These include racquetball and hockey. · When you are in the sun, wear sunglasses that block UV rays and hats with big brims. When you use a tanning lamp or tanning schmitt, protect your eyes. · Get regular vision checkups:  ? Every 2 years if you wear glasses. ? Every 5 years if you don't wear glasses. ? As your doctor recommends, if you have diabetes, a family history of eye problems, or a vision problem such as glaucoma, cataracts, or macular degeneration. ? As your doctor recommends, if you take medicines that may affect the eyes. These include any medicine that you put in your eyes, medicines (called blood thinners) that prevent blood clots, and medicines for bladder control problems. Where can you learn more? Go to http://dionte-carolina.info/. Enter P218 in the search box to learn more about \"Learning About Your Eyes. \"  Current as of: May 5, 2019  Content Version: 12.2  © 4979-5842 [employer]. Care instructions adapted under license by [employer] (which disclaims liability or warranty for this information). If you have questions about a medical condition or this instruction, always ask your healthcare professional. [place] [address] any warranty or liability for your use of this information.

## 2020-01-09 NOTE — ED TRIAGE NOTES
Patient here with c/o irritation to bilateral eyes with redness x 5 days. States eyes are watering, denies itching or crusting upon awaking.

## 2020-01-09 NOTE — ED NOTES
Emergency Department Nursing Plan of Care       The Nursing Plan of Care is developed from the Nursing assessment and Emergency Department Attending provider initial evaluation. The plan of care may be reviewed in the ED Provider note.     The Plan of Care was developed with the following considerations:   Patient / Family readiness to learn indicated by:verbalized understanding  Persons(s) to be included in education: patient  Barriers to Learning/Limitations:No    Signed     Rosy Thomas RN    1/9/2020   1:34 PM

## 2020-01-09 NOTE — ED PROVIDER NOTES
EMERGENCY DEPARTMENT HISTORY AND PHYSICAL EXAM    Date: 1/9/2020  Patient Name: Mindy Madsen    History of Presenting Illness     Chief Complaint   Patient presents with    Eye Pain         History Provided By: Patient    HPI: Mindy Madsen is a 60 y.o. female with a PMH of asthma, hypertension, COPD, depression, sleep apnea who presents with bilateral eye redness x5 days. Patient states symptoms initially started with redness to the eyes (L>R) and some associated pain. Patient states symptoms went away for 1 day and then came back. Patient does report associated photophobia but no drainage or discharge from the eye and no foreign body sensation. Patient states that she was having some throbbing behind the left eye which she rated 5 out of 10 but states she is not really having patient states her primary care doctor advised her to make an appointment with eye doctor and states that when she tried they were unable to see her so they advised her to come to the ER to get checked for \"blood clots behind my eye since I have been on prednisone. \"  Patient denies any blurred vision, double vision or loss of vision. PCP: Saintclair Spiro, NP    Current Outpatient Medications   Medication Sig Dispense Refill    azithromycin (ZITHROMAX Z-IDANIA) 250 mg tablet Take two tablets today then one tablet daily 6 Tab 0    losartan-hydroCHLOROthiazide (HYZAAR) 100-25 mg per tablet Take 1 Tab by mouth daily. 30 Tab 0    albuterol-ipratropium (DUO-NEB) 2.5 mg-0.5 mg/3 ml nebu 3 mL by Nebulization route every four (4) hours as needed for Cough. 30 Nebule 0    albuterol (PROVENTIL HFA, VENTOLIN HFA, PROAIR HFA) 90 mcg/actuation inhaler Take 2 Puffs by inhalation every four (4) hours as needed for Wheezing. 1 Inhaler 0    acetaminophen (TYLENOL ARTHRITIS PAIN) 650 mg TbER Take 650 mg by mouth every eight (8) hours.  traMADol (ULTRAM) 50 mg tablet Take 1 Tab by mouth every six (6) hours as needed for Pain.  Max Daily Amount: 200 mg. Indications: Pain 10 Tab 0    ondansetron (ZOFRAN ODT) 4 mg disintegrating tablet Take 1 Tab by mouth every eight (8) hours as needed for Nausea. 10 Tab 0    ondansetron (ZOFRAN ODT) 4 mg disintegrating tablet Take 1 Tab by mouth every eight (8) hours as needed for Nausea. 10 Tab 0    methadone (DOLOPHINE) 10 mg/mL solution Take 5 mg by mouth.  losartan-hydrochlorothiazide (HYZAAR) 100-25 mg per tablet Take 1 Tab by mouth daily.  PROAIR HFA 90 mcg/actuation inhaler   11    cloNIDine (CATAPRES) 0.1 mg tablet Take 0.3 mg by mouth daily.  ASPIRIN PO Take 81 ng by mouth daily.  albuterol (PROVENTIL VENTOLIN) 2.5 mg /3 mL (0.083 %) nebulizer solution 2.5 mg by Nebulization route three (3) times daily as needed for Wheezing.          Past History     Past Medical History:  Past Medical History:   Diagnosis Date    Arthritis     Asthma     Chronic obstructive pulmonary disease (HCC)     Chronic pain     back - was seeing a pain specialist    Hypertension     Other ill-defined conditions(079.89)     heart murmur    Psychiatric disorder     on depression med after death of     Unspecified sleep apnea     uses CPAP/oxygen for night use on order but has not gotten machine       Past Surgical History:  Past Surgical History:   Procedure Laterality Date    HX HYSTERECTOMY      HX ORTHOPAEDIC Bilateral     hip replacements    HX ORTHOPAEDIC Right     knee replacement    HX ORTHOPAEDIC Left     callus removed from toe    HX ORTHOPAEDIC Left     cyst removed from back of hand    NEUROLOGICAL PROCEDURE UNLISTED      4th and 5th discs in neck removed       Family History:  Family History   Problem Relation Age of Onset    Diabetes Mother     Hypertension Mother     Hypertension Father     Diabetes Father     Liver Disease Father     Lung Disease Father         asbestos       Social History:  Social History     Tobacco Use    Smoking status: Current Every Day Smoker     Packs/day: 0.50    Smokeless tobacco: Never Used    Tobacco comment: cigarettes   Substance Use Topics    Alcohol use: No    Drug use: No       Allergies: Allergies   Allergen Reactions    Robaxin [Methocarbamol] Rash, Itching and Nausea Only         Review of Systems   Review of Systems   Constitutional: Negative for chills and fever. Eyes: Positive for photophobia, pain and redness. Negative for discharge and visual disturbance. Allergic/Immunologic: Negative for immunocompromised state. Neurological: Negative for speech difficulty and weakness. All other systems reviewed and are negative. Physical Exam     Vitals:    01/09/20 1311   BP: 170/82   Resp: 18   Temp: 99 °F (37.2 °C)   SpO2: 91%   Weight: 82.1 kg (181 lb)   Height: 5' 4\" (1.626 m)     Physical Exam  Vitals signs and nursing note reviewed. Constitutional:       General: She is not in acute distress. Appearance: She is well-developed. HENT:      Head: Normocephalic and atraumatic. Eyes:      General: Lids are normal.         Right eye: No discharge. Intraocular pressure: Right eye pressure is 5 mmHg. Left eye pressure is 6 mmHg. Measurements were taken using a handheld tonometer. Extraocular Movements: Extraocular movements intact. Conjunctiva/sclera:      Right eye: Right conjunctiva is injected ( Mild). Left eye: Left conjunctiva is injected ( Mild). Comments: VA: OD: 20/40, OS 20/40, OU 20/30   Cardiovascular:      Rate and Rhythm: Normal rate and regular rhythm. Heart sounds: Normal heart sounds. Pulmonary:      Effort: Pulmonary effort is normal. No respiratory distress. Breath sounds: Normal breath sounds. No wheezing or rales. Skin:     General: Skin is warm and dry. Neurological:      Mental Status: She is alert and oriented to person, place, and time. Psychiatric:         Behavior: Behavior normal.         Thought Content:  Thought content normal.         Judgment: Judgment normal. Diagnostic Study Results     Labs -   No results found for this or any previous visit (from the past 12 hour(s)). Radiologic Studies -   No orders to display     CT Results  (Last 48 hours)    None        CXR Results  (Last 48 hours)    None            Medical Decision Making   I am the first provider for this patient. I reviewed the vital signs, available nursing notes, past medical history, past surgical history, family history and social history. Vital Signs-Reviewed the patient's vital signs. Disposition:  Discharged    DISCHARGE NOTE:   2:01 PM    Pt advised that she needs a dilated eye exam or optic camera evaluation to assess retinal veins and arteries for abnormalities. Care plan outlined and precautions discussed. Patient has no new complaints, changes, or physical findings. All medications were reviewed with the patient; will d/c home. All of pt's questions and concerns were addressed. Patient was instructed and agrees to follow up with ophtalmology, as well as to return to the ED upon further deterioration. Patient is ready to go home. Follow-up Information     Follow up With Specialties Details Why Contact Info    Raul Washington MD Ophthalmology Schedule an appointment as soon as possible for a visit today  [address] [place] [address] [place]  [phone]            Current Discharge Medication List      CONTINUE these medications which have NOT CHANGED    Details   azithromycin (ZITHROMAX Z-IDANIA) 250 mg tablet Take two tablets today then one tablet daily  Qty: 6 Tab, Refills: 0      !! losartan-hydroCHLOROthiazide (HYZAAR) 100-25 mg per tablet Take 1 Tab by mouth daily. Qty: 30 Tab, Refills: 0      albuterol-ipratropium (DUO-NEB) 2.5 mg-0.5 mg/3 ml nebu 3 mL by Nebulization route every four (4) hours as needed for Cough.   Qty: 30 Nebule, Refills: 0      !! albuterol (PROVENTIL HFA, VENTOLIN HFA, PROAIR HFA) 90 mcg/actuation inhaler Take 2 Puffs by inhalation every four (4) hours as needed for Wheezing. Qty: 1 Inhaler, Refills: 0      acetaminophen (TYLENOL ARTHRITIS PAIN) 650 mg TbER Take 650 mg by mouth every eight (8) hours. traMADol (ULTRAM) 50 mg tablet Take 1 Tab by mouth every six (6) hours as needed for Pain. Max Daily Amount: 200 mg. Indications: Pain  Qty: 10 Tab, Refills: 0    Associated Diagnoses: Acute otitis media, unspecified otitis media type      !! ondansetron (ZOFRAN ODT) 4 mg disintegrating tablet Take 1 Tab by mouth every eight (8) hours as needed for Nausea. Qty: 10 Tab, Refills: 0    Associated Diagnoses: Acute otitis media, unspecified otitis media type      !! ondansetron (ZOFRAN ODT) 4 mg disintegrating tablet Take 1 Tab by mouth every eight (8) hours as needed for Nausea. Qty: 10 Tab, Refills: 0      methadone (DOLOPHINE) 10 mg/mL solution Take 5 mg by mouth. !! losartan-hydrochlorothiazide (HYZAAR) 100-25 mg per tablet Take 1 Tab by mouth daily. !! PROAIR HFA 90 mcg/actuation inhaler Refills: 11      cloNIDine (CATAPRES) 0.1 mg tablet Take 0.3 mg by mouth daily. ASPIRIN PO Take 81 ng by mouth daily. albuterol (PROVENTIL VENTOLIN) 2.5 mg /3 mL (0.083 %) nebulizer solution 2.5 mg by Nebulization route three (3) times daily as needed for Wheezing. !! - Potential duplicate medications found. Please discuss with provider. Provider Notes (Medical Decision Making):   DDX: allergic v bacterial v viral conjunctivitis, low concern for retinal vein or artery occlusion without any vision loss or vision changes       Procedures:  Procedures    Please note that this dictation was completed with Dragon, computer voice recognition software. Quite often unanticipated grammatical, syntax, homophones, and other interpretive errors are inadvertently transcribed by the computer software. Please disregard these errors. Additionally, please excuse any errors that have escaped final proofreading.     Diagnosis     Clinical Impression:   1.  Redness of both eyes